# Patient Record
Sex: FEMALE | Employment: OTHER
[De-identification: names, ages, dates, MRNs, and addresses within clinical notes are randomized per-mention and may not be internally consistent; named-entity substitution may affect disease eponyms.]

---

## 2021-01-01 ENCOUNTER — TRANSCRIBE ORDERS (OUTPATIENT)
Dept: OTHER | Age: 81
End: 2021-01-01

## 2021-01-01 ENCOUNTER — TRANSFERRED RECORDS (OUTPATIENT)
Dept: HEALTH INFORMATION MANAGEMENT | Facility: CLINIC | Age: 81
End: 2021-01-01

## 2021-01-01 ENCOUNTER — MEDICAL CORRESPONDENCE (OUTPATIENT)
Dept: HEALTH INFORMATION MANAGEMENT | Facility: CLINIC | Age: 81
End: 2021-01-01

## 2021-01-01 DIAGNOSIS — J84.89 NSIP (NONSPECIFIC INTERSTITIAL PNEUMONIA) (H): Primary | ICD-10-CM

## 2021-11-18 NOTE — PROGRESS NOTES
Patient is scheduled for a PFT new appointment with Dr. Gunter on 2/1/2022. 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 Placed orders for PFT.    Shirin Castro RN

## 2021-11-18 NOTE — Clinical Note
Future Appointments  1/31/2022  1:30 PM    CS PULMONARY FUNCTION      CSPULM                2/1/2022   3:00 PM    Demetria Gunter MD         [facility]

## 2022-01-01 ENCOUNTER — DOCUMENTATION ONLY (OUTPATIENT)
Dept: PULMONOLOGY | Facility: CLINIC | Age: 82
End: 2022-01-01

## 2022-01-01 ENCOUNTER — TRANSFERRED RECORDS (OUTPATIENT)
Dept: HEALTH INFORMATION MANAGEMENT | Facility: CLINIC | Age: 82
End: 2022-01-01

## 2022-01-01 ENCOUNTER — PATIENT OUTREACH (OUTPATIENT)
Dept: PULMONOLOGY | Facility: CLINIC | Age: 82
End: 2022-01-01

## 2022-01-01 ENCOUNTER — TELEPHONE (OUTPATIENT)
Dept: PULMONOLOGY | Facility: CLINIC | Age: 82
End: 2022-01-01

## 2022-01-01 ENCOUNTER — TRANSFERRED RECORDS (OUTPATIENT)
Dept: HEALTH INFORMATION MANAGEMENT | Facility: CLINIC | Age: 82
End: 2022-01-01
Payer: MEDICARE

## 2022-01-01 ENCOUNTER — TELEPHONE (OUTPATIENT)
Dept: PULMONOLOGY | Facility: CLINIC | Age: 82
End: 2022-01-01
Payer: MEDICARE

## 2022-01-01 ENCOUNTER — LAB (OUTPATIENT)
Dept: LAB | Facility: CLINIC | Age: 82
End: 2022-01-01
Payer: MEDICARE

## 2022-01-01 ENCOUNTER — PATIENT OUTREACH (OUTPATIENT)
Dept: PULMONOLOGY | Facility: CLINIC | Age: 82
End: 2022-01-01
Payer: MEDICARE

## 2022-01-01 ENCOUNTER — ANCILLARY PROCEDURE (OUTPATIENT)
Dept: CT IMAGING | Facility: CLINIC | Age: 82
End: 2022-01-01
Attending: INTERNAL MEDICINE
Payer: MEDICARE

## 2022-01-01 ENCOUNTER — TEAM CONFERENCE (OUTPATIENT)
Dept: PULMONOLOGY | Facility: CLINIC | Age: 82
End: 2022-01-01
Payer: MEDICARE

## 2022-01-01 ENCOUNTER — OFFICE VISIT (OUTPATIENT)
Dept: PULMONOLOGY | Facility: CLINIC | Age: 82
End: 2022-01-01
Attending: INTERNAL MEDICINE
Payer: MEDICARE

## 2022-01-01 VITALS
WEIGHT: 111 LBS | BODY MASS INDEX: 20.43 KG/M2 | HEART RATE: 69 BPM | SYSTOLIC BLOOD PRESSURE: 137 MMHG | HEIGHT: 62 IN | DIASTOLIC BLOOD PRESSURE: 87 MMHG | OXYGEN SATURATION: 99 % | RESPIRATION RATE: 17 BRPM

## 2022-01-01 VITALS — SYSTOLIC BLOOD PRESSURE: 136 MMHG | HEART RATE: 71 BPM | DIASTOLIC BLOOD PRESSURE: 72 MMHG | OXYGEN SATURATION: 100 %

## 2022-01-01 DIAGNOSIS — J67.9 HYPERSENSITIVITY PNEUMONITIS (H): ICD-10-CM

## 2022-01-01 DIAGNOSIS — J84.9 ILD (INTERSTITIAL LUNG DISEASE) (H): Primary | ICD-10-CM

## 2022-01-01 DIAGNOSIS — J84.89 NSIP (NONSPECIFIC INTERSTITIAL PNEUMONIA) (H): ICD-10-CM

## 2022-01-01 DIAGNOSIS — J45.909 REACTIVE AIRWAY DISEASE: ICD-10-CM

## 2022-01-01 DIAGNOSIS — R09.02 HYPOXIA: ICD-10-CM

## 2022-01-01 DIAGNOSIS — J84.9 ILD (INTERSTITIAL LUNG DISEASE) (H): ICD-10-CM

## 2022-01-01 DIAGNOSIS — J45.909 REACTIVE AIRWAY DISEASE: Primary | ICD-10-CM

## 2022-01-01 DIAGNOSIS — J30.1 NON-SEASONAL ALLERGIC RHINITIS DUE TO POLLEN: ICD-10-CM

## 2022-01-01 DIAGNOSIS — J67.9 HYPERSENSITIVITY PNEUMONITIS (H): Primary | ICD-10-CM

## 2022-01-01 DIAGNOSIS — J84.89 NSIP (NONSPECIFIC INTERSTITIAL PNEUMONIA) (H): Primary | ICD-10-CM

## 2022-01-01 DIAGNOSIS — J43.9 PULMONARY EMPHYSEMA, UNSPECIFIED EMPHYSEMA TYPE (H): Primary | ICD-10-CM

## 2022-01-01 LAB
6 MIN WALK (FT): 680 FT
6 MIN WALK (M): 207 M
A ALTERNATA IGE QN: <0.1 KU(A)/L
A FLAVUS AB SER QL ID: NORMAL
A FUMIGATUS IGE QN: <0.1 KU(A)/L
A FUMIGATUS1 AB SER QL ID: NORMAL
A FUMIGATUS2 AB SER QL ID: NORMAL
A FUMIGATUS3 AB SER QL ID: NORMAL
A FUMIGATUS6 AB SER QL ID: NORMAL
A PULLULANS AB SER QL ID: NORMAL
ALBUMIN SERPL-MCNC: 3 G/DL (ref 3.4–5)
ALDOLASE SERPL-CCNC: 4.9 U/L
ALP SERPL-CCNC: 44 U/L (ref 40–150)
ALT SERPL W P-5'-P-CCNC: 32 U/L (ref 0–50)
ANION GAP SERPL CALCULATED.3IONS-SCNC: 5 MMOL/L (ref 3–14)
AST SERPL W P-5'-P-CCNC: 25 U/L (ref 0–45)
BASOPHILS # BLD AUTO: 0 10E3/UL (ref 0–0.2)
BASOPHILS NFR BLD AUTO: 0 %
BERMUDA GRASS IGE QN: <0.1 KU(A)/L
BILIRUB DIRECT SERPL-MCNC: 0.1 MG/DL (ref 0–0.2)
BILIRUB SERPL-MCNC: 0.4 MG/DL (ref 0.2–1.3)
BUN SERPL-MCNC: 25 MG/DL (ref 7–30)
C HERBARUM IGE QN: <0.1 KU(A)/L
CALCIUM SERPL-MCNC: 9.3 MG/DL (ref 8.5–10.1)
CAT DANDER IGG QN: <0.1 KU(A)/L
CEDAR IGE QN: <0.1 KU(A)/L
CHLORIDE BLD-SCNC: 99 MMOL/L (ref 94–109)
CO2 SERPL-SCNC: 32 MMOL/L (ref 20–32)
COMMON RAGWEED IGE QN: <0.1 KU(A)/L
COTTONWOOD IGE QN: <0.1 KU(A)/L
CREAT SERPL-MCNC: 0.98 MG/DL (ref 0.52–1.04)
CRP SERPL-MCNC: 16.4 MG/L (ref 0–8)
D FARINAE IGE QN: <0.1 KU(A)/L
D PTERONYSS IGE QN: <0.1 KU(A)/L
DLCOUNC-%PRED-PRE: 37 %
DLCOUNC-PRE: 6.53 ML/MIN/MMHG
DLCOUNC-PRED: 17.31 ML/MIN/MMHG
DOG DANDER+EPITH IGE QN: <0.1 KU(A)/L
EOSINOPHIL # BLD AUTO: 0 10E3/UL (ref 0–0.7)
EOSINOPHIL NFR BLD AUTO: 0 %
ERV-%PRED-PRE: 71 %
ERV-PRE: 0.51 L
ERV-PRED: 0.71 L
ERYTHROCYTE [DISTWIDTH] IN BLOOD BY AUTOMATED COUNT: 14.7 % (ref 10–15)
ERYTHROCYTE [SEDIMENTATION RATE] IN BLOOD BY WESTERGREN METHOD: 35 MM/HR (ref 0–30)
EXPTIME-PRE: 7.04 SEC
FEF2575-%PRED-POST: 96 %
FEF2575-%PRED-PRE: 100 %
FEF2575-POST: 1.28 L/SEC
FEF2575-PRE: 1.34 L/SEC
FEF2575-PRED: 1.33 L/SEC
FEFMAX-%PRED-PRE: 63 %
FEFMAX-PRE: 2.63 L/SEC
FEFMAX-PRED: 4.18 L/SEC
FEV1-%PRED-PRE: 71 %
FEV1-PRE: 1.16 L
FEV1FEV6-PRE: 86 %
FEV1FEV6-PRED: 77 %
FEV1FVC-PRE: 86 %
FEV1FVC-PRED: 78 %
FEV1SVC-PRE: 84 %
FEV1SVC-PRED: 64 %
FIFMAX-PRE: 1.7 L/SEC
FVC-%PRED-PRE: 64 %
FVC-PRE: 1.36 L
FVC-PRED: 2.1 L
GFR SERPL CREATININE-BSD FRML MDRD: 57 ML/MIN/1.73M2
GLUCOSE BLD-MCNC: 129 MG/DL (ref 70–99)
HCT VFR BLD AUTO: 38.9 % (ref 35–47)
HGB BLD-MCNC: 12.3 G/DL (ref 11.7–15.7)
IC-%PRED-PRE: 48 %
IC-PRE: 0.87 L
IC-PRED: 1.81 L
IGE SERPL-ACNC: 5 KU/L (ref 0–114)
IMM GRANULOCYTES # BLD: 0.1 10E3/UL
IMM GRANULOCYTES NFR BLD: 1 %
LYMPHOCYTES # BLD AUTO: 1.2 10E3/UL (ref 0.8–5.3)
LYMPHOCYTES NFR BLD AUTO: 10 %
MAPLE IGE QN: <0.1 KU(A)/L
MARSH ELDER IGE QN: <0.1 KU(A)/L
MCH RBC QN AUTO: 30.4 PG (ref 26.5–33)
MCHC RBC AUTO-ENTMCNC: 31.6 G/DL (ref 31.5–36.5)
MCV RBC AUTO: 96 FL (ref 78–100)
MONOCYTES # BLD AUTO: 0.4 10E3/UL (ref 0–1.3)
MONOCYTES NFR BLD AUTO: 3 %
MOUSE URINE PROT IGE QN: <0.1 KU(A)/L
NETTLE IGE QN: <0.1 KU(A)/L
NEUTROPHILS # BLD AUTO: 10.2 10E3/UL (ref 1.6–8.3)
NEUTROPHILS NFR BLD AUTO: 86 %
NRBC # BLD AUTO: 0 10E3/UL
NRBC BLD AUTO-RTO: 0 /100
P NOTATUM IGE QN: <0.1 KU(A)/L
PIGEON SERUM AB QL ID: NORMAL
PLATELET # BLD AUTO: 213 10E3/UL (ref 150–450)
POTASSIUM BLD-SCNC: 5.2 MMOL/L (ref 3.4–5.3)
PROT SERPL-MCNC: 6.9 G/DL (ref 6.8–8.8)
RBC # BLD AUTO: 4.05 10E6/UL (ref 3.8–5.2)
ROACH IGE QN: 0.14 KU(A)/L
S RECTIVIRGULA AB SER QL ID: NORMAL
S VIRIDIS AB SER QL ID: NORMAL
SALTWORT IGE QN: <0.1 KU(A)/L
SILVER BIRCH IGE QN: <0.1 KU(A)/L
SODIUM SERPL-SCNC: 136 MMOL/L (ref 133–144)
T CANDIDUS AB SER QL: NORMAL
T VULGARIS1 AB SER QL ID: NORMAL
TIMOTHY IGE QN: <0.1 KU(A)/L
TPMT BLD-CCNC: 32.7 U/ML
VA-%PRED-PRE: 56 %
VA-PRE: 2.37 L
VC-%PRED-PRE: 54 %
VC-PRE: 1.38 L
VC-PRED: 2.52 L
WBC # BLD AUTO: 11.8 10E3/UL (ref 4–11)
WHITE ASH IGE QN: <0.1 KU(A)/L
WHITE ELM IGE QN: <0.1 KU(A)/L
WHITE MULBERRY IGE QN: <0.1 KU(A)/L
WHITE OAK IGE QN: <0.1 KU(A)/L

## 2022-01-01 PROCEDURE — G0463 HOSPITAL OUTPT CLINIC VISIT: HCPCS

## 2022-01-01 PROCEDURE — 36415 COLL VENOUS BLD VENIPUNCTURE: CPT | Performed by: PATHOLOGY

## 2022-01-01 PROCEDURE — 85652 RBC SED RATE AUTOMATED: CPT | Performed by: PATHOLOGY

## 2022-01-01 PROCEDURE — 86003 ALLG SPEC IGE CRUDE XTRC EA: CPT | Performed by: INTERNAL MEDICINE

## 2022-01-01 PROCEDURE — 94726 PLETHYSMOGRAPHY LUNG VOLUMES: CPT | Performed by: INTERNAL MEDICINE

## 2022-01-01 PROCEDURE — 86606 ASPERGILLUS ANTIBODY: CPT | Mod: 90 | Performed by: PATHOLOGY

## 2022-01-01 PROCEDURE — 94618 PULMONARY STRESS TESTING: CPT | Performed by: INTERNAL MEDICINE

## 2022-01-01 PROCEDURE — 94729 DIFFUSING CAPACITY: CPT | Performed by: INTERNAL MEDICINE

## 2022-01-01 PROCEDURE — 99214 OFFICE O/P EST MOD 30 MIN: CPT | Performed by: INTERNAL MEDICINE

## 2022-01-01 PROCEDURE — G1004 CDSM NDSC: HCPCS | Mod: GC | Performed by: RADIOLOGY

## 2022-01-01 PROCEDURE — 86140 C-REACTIVE PROTEIN: CPT | Performed by: PATHOLOGY

## 2022-01-01 PROCEDURE — 99000 SPECIMEN HANDLING OFFICE-LAB: CPT | Performed by: PATHOLOGY

## 2022-01-01 PROCEDURE — 82085 ASSAY OF ALDOLASE: CPT | Mod: 90 | Performed by: PATHOLOGY

## 2022-01-01 PROCEDURE — 82248 BILIRUBIN DIRECT: CPT | Performed by: PATHOLOGY

## 2022-01-01 PROCEDURE — 71250 CT THORAX DX C-: CPT | Mod: MG | Performed by: RADIOLOGY

## 2022-01-01 PROCEDURE — 86331 IMMUNODIFFUSION OUCHTERLONY: CPT | Mod: 90 | Performed by: PATHOLOGY

## 2022-01-01 PROCEDURE — 85025 COMPLETE CBC W/AUTO DIFF WBC: CPT | Performed by: PATHOLOGY

## 2022-01-01 PROCEDURE — 82657 ENZYME CELL ACTIVITY: CPT | Mod: 90 | Performed by: PATHOLOGY

## 2022-01-01 PROCEDURE — 94060 EVALUATION OF WHEEZING: CPT | Performed by: INTERNAL MEDICINE

## 2022-01-01 PROCEDURE — 80053 COMPREHEN METABOLIC PANEL: CPT | Performed by: PATHOLOGY

## 2022-01-01 PROCEDURE — 99205 OFFICE O/P NEW HI 60 MIN: CPT | Mod: 25 | Performed by: INTERNAL MEDICINE

## 2022-01-01 PROCEDURE — G0463 HOSPITAL OUTPT CLINIC VISIT: HCPCS | Mod: 25

## 2022-01-01 RX ORDER — DILTIAZEM HYDROCHLORIDE 120 MG/1
CAPSULE, EXTENDED RELEASE ORAL
COMMUNITY
Start: 2022-01-01

## 2022-01-01 RX ORDER — ARFORMOTEROL TARTRATE 15 UG/2ML
15 SOLUTION RESPIRATORY (INHALATION) 2 TIMES DAILY
Qty: 360 ML | Refills: 1 | Status: SHIPPED | OUTPATIENT
Start: 2022-01-01 | End: 2022-01-01

## 2022-01-01 RX ORDER — ROSUVASTATIN CALCIUM 5 MG/1
TABLET, COATED ORAL
COMMUNITY

## 2022-01-01 RX ORDER — CITALOPRAM HYDROBROMIDE 20 MG/1
10 TABLET ORAL
COMMUNITY

## 2022-01-01 RX ORDER — AZATHIOPRINE 75 MG/1
50 TABLET ORAL DAILY
Qty: 14 TABLET | Refills: 0 | Status: SHIPPED | OUTPATIENT
Start: 2022-01-01

## 2022-01-01 RX ORDER — AZATHIOPRINE 50 MG/1
50 TABLET ORAL DAILY
Qty: 14 TABLET | Refills: 0 | Status: SHIPPED | OUTPATIENT
Start: 2022-01-01 | End: 2022-01-01

## 2022-01-01 RX ORDER — FORMOTEROL FUMARATE DIHYDRATE 20 UG/2ML
20 SOLUTION RESPIRATORY (INHALATION) EVERY 12 HOURS
Qty: 90 ML | Refills: 3 | Status: SHIPPED | OUTPATIENT
Start: 2022-01-01

## 2022-01-01 RX ORDER — PREDNISONE 20 MG/1
20 TABLET ORAL DAILY
Qty: 30 TABLET | Refills: 3 | Status: CANCELLED | OUTPATIENT
Start: 2022-01-01

## 2022-01-01 RX ORDER — DILTIAZEM HCL 60 MG
TABLET ORAL
COMMUNITY
Start: 2021-01-01

## 2022-01-01 RX ORDER — SULFAMETHOXAZOLE AND TRIMETHOPRIM 400; 80 MG/1; MG/1
TABLET ORAL
Qty: 30 TABLET | Refills: 11 | Status: SHIPPED | OUTPATIENT
Start: 2022-01-01

## 2022-01-01 RX ORDER — SULFAMETHOXAZOLE/TRIMETHOPRIM 800-160 MG
1 TABLET ORAL
Qty: 36 TABLET | Refills: 0 | Status: SHIPPED | OUTPATIENT
Start: 2022-01-01 | End: 2022-01-01

## 2022-01-01 RX ORDER — AZELASTINE 1 MG/ML
SPRAY, METERED NASAL
COMMUNITY
Start: 2022-01-01

## 2022-01-01 RX ORDER — DIGOXIN 125 MCG
125 TABLET ORAL
COMMUNITY
Start: 2022-01-01

## 2022-01-01 RX ORDER — PREDNISONE 20 MG/1
20 TABLET ORAL DAILY
Qty: 30 TABLET | Refills: 3 | Status: SHIPPED | OUTPATIENT
Start: 2022-01-01 | End: 2022-01-01

## 2022-01-01 RX ORDER — CLOPIDOGREL BISULFATE 75 MG/1
TABLET ORAL
COMMUNITY

## 2022-01-01 RX ORDER — PREDNISONE 20 MG/1
20 TABLET ORAL DAILY
Qty: 30 TABLET | Refills: 1 | Status: SHIPPED | OUTPATIENT
Start: 2022-01-01

## 2022-01-01 RX ORDER — DILTIAZEM HYDROCHLORIDE 180 MG/1
CAPSULE, EXTENDED RELEASE ORAL
COMMUNITY

## 2022-01-01 RX ORDER — CARVEDILOL 3.12 MG/1
TABLET ORAL
COMMUNITY
Start: 2022-01-01

## 2022-01-01 RX ORDER — AMOXICILLIN 500 MG/1
CAPSULE ORAL
COMMUNITY

## 2022-01-01 RX ORDER — MYCOPHENOLATE MOFETIL 500 MG/1
500 TABLET ORAL 2 TIMES DAILY
Qty: 60 TABLET | Refills: 2 | Status: SHIPPED | OUTPATIENT
Start: 2022-01-01

## 2022-01-01 RX ORDER — ARFORMOTEROL TARTRATE 15 UG/2ML
15 SOLUTION RESPIRATORY (INHALATION) 2 TIMES DAILY
Qty: 360 ML | Refills: 1 | Status: SHIPPED | OUTPATIENT
Start: 2022-01-01

## 2022-01-01 RX ORDER — BUDESONIDE 0.5 MG/2ML
0.5 INHALANT ORAL DAILY
Qty: 120 ML | Refills: 1 | Status: SHIPPED | OUTPATIENT
Start: 2022-01-01 | End: 2022-10-17

## 2022-01-01 RX ORDER — ATENOLOL 25 MG/1
TABLET ORAL
COMMUNITY

## 2022-01-01 RX ORDER — METOPROLOL SUCCINATE 50 MG/1
TABLET, EXTENDED RELEASE ORAL
COMMUNITY

## 2022-01-01 RX ORDER — FUROSEMIDE 20 MG
TABLET ORAL
COMMUNITY

## 2022-01-01 RX ORDER — ARFORMOTEROL TARTRATE 15 UG/2ML
15 SOLUTION RESPIRATORY (INHALATION) 2 TIMES DAILY
Qty: 360 ML | Refills: 1 | Status: CANCELLED | OUTPATIENT
Start: 2022-01-01

## 2022-01-01 RX ORDER — PREDNISONE 20 MG/1
40 TABLET ORAL DAILY
Qty: 120 TABLET | Refills: 0 | Status: SHIPPED | OUTPATIENT
Start: 2022-01-01 | End: 2022-01-01

## 2022-01-01 RX ORDER — WARFARIN SODIUM 3 MG/1
TABLET ORAL
COMMUNITY
Start: 2022-01-01

## 2022-01-01 RX ORDER — ONDANSETRON 4 MG/1
TABLET, ORALLY DISINTEGRATING ORAL
COMMUNITY

## 2022-01-01 RX ORDER — AMLODIPINE BESYLATE 5 MG/1
TABLET ORAL
COMMUNITY

## 2022-01-01 ASSESSMENT — PAIN SCALES - GENERAL: PAINLEVEL: NO PAIN (0)

## 2022-01-25 NOTE — PROGRESS NOTES
Patient will be seeing Dr. Miguel on 5/16/22 for new ILD Consult.  Testing orders placed for visit.     Latonya Ocampo RN, BSN  ILD Nurse Care Coordinator  (P) [phone]

## 2022-05-23 NOTE — NURSING NOTE
Chief Complaint   Patient presents with     Consult     New ILD     Medications reviewed and vital signs taken.   Chel Ray CMA

## 2022-05-23 NOTE — PROGRESS NOTES
"[facility]  Pulmonary Medicine  Visit Clinic Note  May 23, 2022    Dear patient. Thank you for visiting with me. I want you to feel respected, understood, and empowered. \"Respect\" is valuing you as much as I would a close family member. \"Empowerment\" happens when you are fully informed, and can make the best possible decision for you.  Please ask me questions!  Challenge anything that is not clear.       ASSESSMENT & PLAN     Patient is an 81-year-old female who presents to pulmonary clinic for further work-up of her interstitial lung disease.    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  - It appears that she had interstitial lung disease noted few months before her she got COVID.  It is hard to determine that her significant worsening was from COVID or ILD worsening.  Her pulmonary function test in May 2022 is worse compared to her PFTs in September 2021.  - 6-minute walk test is requiring 2 L of oxygen with activity which was not required in the past.  - Some of her dyspnea can be explained by her recent stroke as well as ear disease.  But overall she is not improving.  - I will finish her work-up and add HP panel and allergen panel.  - I will order high-resolution CT scan of the chest to further assess her lung disease.  I have low threshold to start her on immunosuppression.  But with the reason being that she is also on warfarin and starting prophylactic antibiotic may increase the risk of bleeding as well as increased risk of glucose be holding off as of now and discuss her case in the ILD conference.  ANCA: Negative.  C reactive pro: Increased in August.  RF, CCP negative, BERTRAND : 1:160 positive.     -I have added LABA/ICS inhaler.   RTC in 3 months.     63 minutes excluding the time spent on cigarette cessation was  " spent on the date of the encounter doing chart review, history and exam, documentation and further activities as noted above.    These conclusions are made at the best of one's knowledge and belief based on the provided evidence such as patient's history and allergy test results and they can change over time or can be incomplete because of missing information's.    I explained the lab values, imagings and findings to the patient.  Patient expressed understanding I did not recognize any barriers to the understanding of the patient.    The above note was dictated using voice recognition software and may include typographical errors. Please contact the author for any clarifications.    Huang Miguel MD   RN Coordinators: Greg/Laly/Mariana: [phone]  ILD RN Coordinators: [phone]  Clinic Number: [phone]  Pager: [phone]       Today's visit note:     Chief Complaint: Porsha CHANDLER Buswendyy is a 81 year old year old female who is being seen for Consult (New ILD )      HPI:   - Patient is an 81 year old female who is referred to Pulmonary clinic for further work up of her ILD.  She was referred to pulmonary clinic. But she had a covid infection in September.     - She had an A fib with RVR in  August 2021. She had a Ct chest at the time and She was noted to have an ILD.      Stroke: January 2022. She is not able to drive anymore.    -ear infection.  She has been treated of Steroids for 21 days which did not improve her ear congestion.  She was prescribned 5 mg of prednisone.   -She has had TAVr in 2021 and which ahd improved her dyspnea.  After her TAVR when she was taken off of amio she had A fib recurrence. And since than she feels that she has had worsening dyspnea.   - Has not tried any inhaler . She feels that her breathing has not improved since her Dyspnea in August.  Her coruse also has been complicated by stroke and Covid as well.  Also ears also has made it hard for her,     -With activity she is mainly  short of breath.  Her family feels that anxiety can be the reason but she is not an anxious person at baseline and I explained that dyspnea can be the reason for anxiety.     ILD exposure questions:  Occupation: School .  Made bread. High school. Wood burning stove.  Used until 7 years ago.   -Lives in farm house.  It is 100 years old. It has been remodeled.   - Gradually since 1968.   Mold: In Basement   Pet bird: Chicken and cows exposure.  Chicken raising. Also lot of straw. Also has a big garden.  Usual vegetables.   Used to have bats in atticks.   Hot tub: No   Portable humidifier:  Has them. It has a filter which she changes.  Its portable.   Brass or woodwind instruments:  Smoking tobacco or marijuana: No smoking history.   secondary smoke history.   Feather pillow/blanket: Used to have all feather comforter. Hasn't had one is many years.   Chemotherapy or radiation therapy: No   Fam hx of ILD: None  Sister has  Rheumatoid Arthritis.          Medications:     No current outpatient medications on file.     No current facility-administered medications for this visit.            Review of Systems:       A complete 10 point review of systems was otherwise negative except as noted in the HPI.        PHYSICAL EXAM:  There were no vitals taken for this visit.     General: Well developed, well nourished, No apparent distress  Eyes: Anicteric  Nose: Nasal mucosa with no edema or hyperemia.  No polyps  Ears: Hearing grossly normal  Mouth: Oral mucosa is moist, without any lesions. No oropharyngeal exudate.  Respiratory: Good air movement. No crackles. No rhonchi. No wheezes  Cardiac: RRR, normal S1, S2. No murmurs. No JVD  Abdomen: Soft, NT/ND  Musculoskeletal: Extremities normal. No clubbing. No cyanosis. No edema.  Skin: No rash on limited exam  Neuro: Normal mentation. Normal speech.  Psych:Normal affect           Data:   All laboratory and imaging data reviewed.      PFT:    5/23/22 9/2021    PFT Interpretation:  I personally reviewed and interpreted the PFTs.    Her peftin 2022 has worsened comapred to 2021.   Chest CT: I personally reviewed and interpreted the CT scan.    -Ct chest in 11/2021 has improved when comapred to 8 /2021 8/26    \      Recent Results (from the past 168 hour(s))   General PFT Lab (Please always keep checked)    Collection Time: 05/23/22 12:25 PM   Result Value Ref Range    FVC-Pred 2.10 L    FVC-Pre 1.36 L    FVC-%Pred-Pre 64 %    FEV1-Pre 1.16 L    FEV1-%Pred-Pre 71 %    FEV1FVC-Pred 78 %    FEV1FVC-Pre 86 %    FEFMax-Pred 4.18 L/sec    FEFMax-Pre 2.63 L/sec    FEFMax-%Pred-Pre 63 %    FEF2575-Pred 1.33 L/sec    FEF2575-Pre 1.34 L/sec    HRH6035-%Pred-Pre 100 %    FEF2575-Post 1.28 L/sec    AFE6503-%Pred-Post 96 %    ExpTime-Pre 7.04 sec    FIFMax-Pre 1.70 L/sec    VC-Pred 2.52 L    VC-Pre 1.38 L    VC-%Pred-Pre 54 %    IC-Pred 1.81 L    IC-Pre 0.87 L    IC-%Pred-Pre 48 %    ERV-Pred 0.71 L    ERV-Pre 0.51 L    ERV-%Pred-Pre 71 %    FEV1FEV6-Pred 77 %    FEV1FEV6-Pre 86 %    DLCOunc-Pred 17.31 ml/min/mmHg    DLCOunc-Pre 6.53 ml/min/mmHg    DLCOunc-%Pred-Pre 37 %    VA-Pre 2.37 L    VA-%Pred-Pre 56 %    FEV1SVC-Pred 64 %    FEV1SVC-Pre 84 %

## 2022-05-23 NOTE — LETTER
"    5/23/2022         RE: Porsha Bullock  [address]  [place] [address]        Dear Colleague,    Thank you for referring your patient, Porsha Bullock, to the [facility] [place]. Please see a copy of my visit note below.    [facility]  Pulmonary Medicine  Visit Clinic Note  May 23, 2022    Dear patient. Thank you for visiting with me. I want you to feel respected, understood, and empowered. \"Respect\" is valuing you as much as I would a close family member. \"Empowerment\" happens when you are fully informed, and can make the best possible decision for you.  Please ask me questions!  Challenge anything that is not clear.       ASSESSMENT & PLAN     Patient is an 81-year-old female who presents to pulmonary clinic for further work-up of her interstitial lung disease.    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  - It appears that she had interstitial lung disease noted few months before her she got COVID.  It is hard to determine that her significant worsening was from COVID or ILD worsening.  Her pulmonary function test in May 2022 is worse compared to her PFTs in September 2021.  - 6-minute walk test is requiring 2 L of oxygen with activity which was not required in the past.  - Some of her dyspnea can be explained by her recent stroke as well as ear disease.  But overall she is not improving.  - I will finish her work-up and add HP panel and allergen panel.  - I will order high-resolution CT scan of the chest to further assess her lung disease.  I have low threshold to start her on immunosuppression.  But with the reason being that she is also on warfarin and starting prophylactic antibiotic may increase the risk of bleeding as well as increased risk of glucose be " holding off as of now and discuss her case in the ILD conference.  ANCA: Negative.  C reactive pro: Increased in August.  RF, CCP negative, BERTRAND : 1:160 positive.     -I have added LABA/ICS inhaler.   RTC in 3 months.     63 minutes excluding the time spent on cigarette cessation was  spent on the date of the encounter doing chart review, history and exam, documentation and further activities as noted above.    These conclusions are made at the best of one's knowledge and belief based on the provided evidence such as patient's history and allergy test results and they can change over time or can be incomplete because of missing information's.    I explained the lab values, imagings and findings to the patient.  Patient expressed understanding I did not recognize any barriers to the understanding of the patient.    The above note was dictated using voice recognition software and may include typographical errors. Please contact the author for any clarifications.    Huang Miguel MD   RN Coordinators: Greg/Laly/Mariana: [phone]  ILD RN Coordinators: [phone]  Clinic Number: [phone]  Pager: [phone]       Today's visit note:     Chief Complaint: Porsha Bullock is a 81 year old year old female who is being seen for Consult (New ILD )      HPI:   - Patient is an 81 year old female who is referred to Pulmonary clinic for further work up of her ILD.  She was referred to pulmonary clinic. But she had a covid infection in September.     - She had an A fib with RVR in  August 2021. She had a Ct chest at the time and She was noted to have an ILD.      Stroke: January 2022. She is not able to drive anymore.    -ear infection.  She has been treated of Steroids for 21 days which did not improve her ear congestion.  She was prescribned 5 mg of prednisone.   -She has had TAVr in 2021 and which ahd improved her dyspnea.  After her TAVR when she was taken off of amio she had A fib recurrence. And since than she feels  that she has had worsening dyspnea.   - Has not tried any inhaler . She feels that her breathing has not improved since her Dyspnea in August.  Her coruse also has been complicated by stroke and Covid as well.  Also ears also has made it hard for her,     -With activity she is mainly short of breath.  Her family feels that anxiety can be the reason but she is not an anxious person at baseline and I explained that dyspnea can be the reason for anxiety.     ILD exposure questions:  Occupation: School .  Made bread. High school. Wood burning stove.  Used until 7 years ago.   -Lives in farm house.  It is 100 years old. It has been remodeled.   - Gradually since 1968.   Mold: In Basement   Pet bird: Chicken and cows exposure.  Chicken raising. Also lot of straw. Also has a big garden.  Usual vegetables.   Used to have bats in atticks.   Hot tub: No   Portable humidifier:  Has them. It has a filter which she changes.  Its portable.   Brass or woodwind instruments:  Smoking tobacco or marijuana: No smoking history.   secondary smoke history.   Feather pillow/blanket: Used to have all feather comforter. Hasn't had one is many years.   Chemotherapy or radiation therapy: No   Fam hx of ILD: None  Sister has  Rheumatoid Arthritis.          Medications:     No current outpatient medications on file.     No current facility-administered medications for this visit.            Review of Systems:       A complete 10 point review of systems was otherwise negative except as noted in the HPI.        PHYSICAL EXAM:  There were no vitals taken for this visit.     General: Well developed, well nourished, No apparent distress  Eyes: Anicteric  Nose: Nasal mucosa with no edema or hyperemia.  No polyps  Ears: Hearing grossly normal  Mouth: Oral mucosa is moist, without any lesions. No oropharyngeal exudate.  Respiratory: Good air movement. No crackles. No rhonchi. No wheezes  Cardiac: RRR, normal S1, S2. No murmurs. No  JVD  Abdomen: Soft, NT/ND  Musculoskeletal: Extremities normal. No clubbing. No cyanosis. No edema.  Skin: No rash on limited exam  Neuro: Normal mentation. Normal speech.  Psych:Normal affect           Data:   All laboratory and imaging data reviewed.      PFT:   5/23/22 9/2021    PFT Interpretation:  I personally reviewed and interpreted the PFTs.    Her peftin 2022 has worsened comapred to 2021.   Chest CT: I personally reviewed and interpreted the CT scan.    -Ct chest in 11/2021 has improved when comapred to 8 /2021 8/26    \      Recent Results (from the past 168 hour(s))   General PFT Lab (Please always keep checked)    Collection Time: 05/23/22 12:25 PM   Result Value Ref Range    FVC-Pred 2.10 L    FVC-Pre 1.36 L    FVC-%Pred-Pre 64 %    FEV1-Pre 1.16 L    FEV1-%Pred-Pre 71 %    FEV1FVC-Pred 78 %    FEV1FVC-Pre 86 %    FEFMax-Pred 4.18 L/sec    FEFMax-Pre 2.63 L/sec    FEFMax-%Pred-Pre 63 %    FEF2575-Pred 1.33 L/sec    FEF2575-Pre 1.34 L/sec    OOK1037-%Pred-Pre 100 %    FEF2575-Post 1.28 L/sec    OGY7155-%Pred-Post 96 %    ExpTime-Pre 7.04 sec    FIFMax-Pre 1.70 L/sec    VC-Pred 2.52 L    VC-Pre 1.38 L    VC-%Pred-Pre 54 %    IC-Pred 1.81 L    IC-Pre 0.87 L    IC-%Pred-Pre 48 %    ERV-Pred 0.71 L    ERV-Pre 0.51 L    ERV-%Pred-Pre 71 %    FEV1FEV6-Pred 77 %    FEV1FEV6-Pre 86 %    DLCOunc-Pred 17.31 ml/min/mmHg    DLCOunc-Pre 6.53 ml/min/mmHg    DLCOunc-%Pred-Pre 37 %    VA-Pre 2.37 L    VA-%Pred-Pre 56 %    FEV1SVC-Pred 64 %    FEV1SVC-Pre 84 %       Again, thank you for allowing me to participate in the care of your patient.        Sincerely,        Huang Miguel MD

## 2022-05-24 NOTE — PROGRESS NOTES
Spoke with patient and daughter regarding starting oxygen. Answered questions. Orders placed and faxed to [facility] ([facility] (ph. [phone]).

## 2022-05-26 NOTE — TELEPHONE ENCOUNTER
----- Message from Huang Miguel MD sent at 5/25/2022 10:20 PM CDT -----  Allen Oscar,     Can you please call patient and tell her that I do want to start her on Prednisone. As of now we will start her on 20 mg daily prednisone. I am not starting Bactrim, yet as we may be able to drop it to 15 if everyone agrees.     -I wanna avoid prophylactic abxx as it will really mess up her warfarin and that can be prety risky.     -If she asks why prednisone than it is because her inflammatory markers are higher which suggests ongoing inflammation.     Thanks,  Huang

## 2022-05-26 NOTE — RESULT ENCOUNTER NOTE
Allen Oscar,     Can you please call patient and tell her that I do want to start her on Prednisone. As of now we will start her on 20 mg daily prednisone. I am not starting Bactrim, yet as we may be able to drop it to 15 if everyone agrees.     -I wanna avoid prophylactic abxx as it will really mess up her warfarin and that can be prety risky.     -If she asks why prednisone than it is because her inflammatory markers are higher which suggests ongoing inflammation.     Thanks,  Hem

## 2022-06-13 NOTE — PROGRESS NOTES
Contacted patient regarding Dr. Miguel ordering Cellcept and bactrim.  Discussed plan with patient including side effects side of Cellcept. Informed patient that Bactrim can affect INR and that she will need to notify her anticoagulation clinic that she is starting on Bactrim with recommendation to check INR at least weekly until stable.  Patient verbalizes understanding.  She requests her daughter, Valery, to be called to discuss the treatment plan.  Contacted Valery and discussed the plan, side effects, and need for monitoring INR and other labs for Cellcept.

## 2022-06-13 NOTE — TELEPHONE ENCOUNTER
ILD Conference      Patient Name: Porsha CHANDLER Busbey    Reason for conference discussion/Specific Question:  Review CT scan, diagnosis and treatment plan.    Referring Physician:      Radiology Interpretation: Patient has ICD in place, lots of atherosclerosis.  Pleural thickening, small nodule, coarse fibrosis, gas trapping, and striking nodularity. HP is the most likely fit.     Pathology Interpretation: N/A      There was a consensus recommendation for the following actions:   Start patient on low dose Cellcept 500 mg BID until appointment with Dr. Miguel in August.  Also start patient on Bactrim for pjp. Patient on Warfarin so will need to alert INR clinic that she will be taking Bactrim.     Pulmonary/ILD Provider: Dr. Huang Miguel.

## 2022-06-13 NOTE — TELEPHONE ENCOUNTER
[facility] Call Center    Phone Message    May a detailed message be left on voicemail: yes     Reason for Call: Medication Question or concern regarding medication   Prescription Clarification  Name of Medication: sulfamethoxazole-trimethoprim (BACTRIM) 400-80 MG tablet  Prescribing Provider:    Pharmacy: [id number] [place] [address]   What on the order needs clarification?       Per Pharmacy the medication that was prescribe for pt would interfere with some other current medication pt is taking. Please call Pharmacy back to discuss. Thank you!          Action Taken: Message routed to:  [facility] PULM    Travel Screening: Not Applicable

## 2022-06-14 NOTE — TELEPHONE ENCOUNTER
RN spoke with pharmacist and reviewed that pt will follow with INR clinic weekly to ensure ok. Pharmacist will dispense Rx.   Celestina Armas RN BSN

## 2022-06-27 NOTE — PROGRESS NOTES
Daughter Valery called asking about results of DAVID. Reviewed results of DAVID did show she was below 88% for over 38 minutes, recommending patient should wear oxygen at night 2 LPM. New orders faxed to oxygen company.

## 2022-07-19 NOTE — PROGRESS NOTES
Patient's daughter contacted regarding patient had an EDG yesterday and she was found to have a stomach ulcer. She is wondering if the Cellcept medication need to be switch to something else. Message sent to Dr Miguel.    Dr Miguel, called the daughter explained that less likely due to cellcept. Most likely due to prednisone.     -She is actually open to restart cellcept.   Plan to stay on cellcept.

## 2022-08-03 NOTE — TELEPHONE ENCOUNTER
Pt's daughter called stating pt is not going to talk Cellcept. Pt took one dose on Monday evening at 5pm at 7pm pt complained of tightness in chest and sob. They did a neb treatment which helped but pt still struggled through the next morning. Pt would be interested in some other medication but not Cellcept. Daughter states pt is doing well with oxygen. Reviewed recent DAVID on 2 liters that was enough O2 for pt at night. RN advised would update Dr. Miguel.   Celestina Armas RN

## 2022-08-18 NOTE — NURSING NOTE
Chief Complaint   Patient presents with     Interstitial Lung Disease (ILD)     3 month follow up      Vitals were taken and medications were reconciled.    Eileen Hernandez RMA  1:45 PM

## 2022-08-18 NOTE — PROGRESS NOTES
"[facility]  Pulmonary Medicine  Visit Clinic Note      Dear patient. Thank you for visiting with me. I want you to feel respected, understood, and empowered. \"Respect\" is valuing you as much as I would a close family member. \"Empowerment\" happens when you are fully informed, and can make the best possible decision for you.  Please ask me questions!  Challenge anything that is not clear.       ASSESSMENT & PLAN     Patient is an 81-year-old female who presents to pulmonary clinic for further work-up of her interstitial lung disease.    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  - It appears that she had interstitial lung disease noted few months before her she got COVID.  It is hard to determine that her significant worsening was from COVID or ILD worsening.  Her pulmonary function test in May 2022 is worse compared to her PFTs in September 2021.  - 6-minute walk test is requiring 2 L of oxygen with activity which was not required in the past.  - Some of her dyspnea can be explained by her recent stroke as well as ear disease.  But overall she is not improving.   -Hr HP pane lahs also came back negative.    -She was started on cellcept in March 2022 but she was not able to tolerate it. She had gi ulcer.   -She will be started on imuran.    -I will follow her with Ct chest and PFts in next 3 months. Contineu with current antibacterials.     ANCA: Negative.  C reactive pro: Increased in August.  RF, CCP negative, BERTRAND : 1:160 positive.     -I have added LABA/ICS  Nebulizers.   RTC in 3 months.     34 minutes excluding the time spent on cigarette cessation was  spent on the date of the encounter doing chart review, history and exam, documentation and further activities as noted above.    These conclusions are made at the best of " one's knowledge and belief based on the provided evidence such as patient's history and allergy test results and they can change over time or can be incomplete because of missing information's.    I explained the lab values, imagings and findings to the patient.  Patient expressed understanding I did not recognize any barriers to the understanding of the patient.    The above note was dictated using voice recognition software and may include typographical errors. Please contact the author for any clarifications.    Huang Miguel MD   RN Coordinators: Greg/Laly/Mariana: [phone]  ILD RN Coordinators: [phone]  Clinic Number: [phone]  Pager: [phone]       Today's visit note:     Chief Complaint: Porsha Bullock is a 81 year old year old female who is being seen for Interstitial Lung Disease (ILD) (3 month follow up )      HPI:   - Patient is an 81 year old female who is referred to Pulmonary clinic for further work up of her ILD.  She was referred to pulmonary clinic. But she had a covid infection in September.     - She had an A fib with RVR in  August 2021. She had a Ct chest at the time and She was noted to have an ILD.      Stroke: January 2022. She is not able to drive anymore.    -ear infection.  She has been treated of Steroids for 21 days which did not improve her ear congestion.  She was prescribned 5 mg of prednisone.   -She has had TAVr in 2021 and which ahd improved her dyspnea.  After her TAVR when she was taken off of amio she had A fib recurrence. And since than she feels that she has had worsening dyspnea.   - Has not tried any inhaler . She feels that her breathing has not improved since her Dyspnea in August.  Her coruse also has been complicated by stroke and Covid as well.  Also ears also has made it hard for her,     -With activity she is mainly short of breath.  Her family feels that anxiety can be the reason but she is not an anxious person at baseline and I explained that  dyspnea can be the reason for anxiety.     ILD exposure questions:  Occupation: School .  Made bread. High school. Wood burning stove.  Used until 7 years ago.   -Lives in farm house.  It is 100 years old. It has been remodeled.   - Gradually since 1968.   Mold: In Basement   Pet bird: Chicken and cows exposure.  Chicken raising. Also lot of straw. Also has a big garden.  Usual vegetables.   Used to have bats in atticks.   Hot tub: No   Portable humidifier:  Has them. It has a filter which she changes.  Its portable.   Brass or woodwind instruments:  Smoking tobacco or marijuana: No smoking history.   secondary smoke history.   Feather pillow/blanket: Used to have all feather comforter. Hasn't had one is many years.   Chemotherapy or radiation therapy: No   Fam hx of ILD: None  Sister has  Rheumatoid Arthritis.     #Interval History: 8/17/22  -She had a significant side effects to cell cept. We will try Imuran for her.          Medications:     Current Outpatient Medications   Medication     amLODIPine (NORVASC) 5 MG tablet     B Complex Vitamins (VITAMIN B COMPLEX PO)     carvedilol (COREG) 3.125 MG tablet     citalopram (CELEXA) 20 MG tablet     digoxin (LANOXIN) 125 MCG tablet     diltiazem ER (TIAZAC) 120 MG 24 hr ER beaded capsule     Multiple Vitamins-Minerals (VITAMIN D3 COMPLETE PO)     predniSONE (DELTASONE) 20 MG tablet     rosuvastatin (CRESTOR) 5 MG tablet     warfarin ANTICOAGULANT (COUMADIN) 3 MG tablet     amoxicillin (AMOXIL) 500 MG capsule     atenolol (TENORMIN) 25 MG tablet     azelastine (ASTELIN) 0.1 % nasal spray     Calcium Carbonate (CALCIUM 500 PO)     clopidogrel (PLAVIX) 75 MG tablet     diltiazem (CARDIZEM) 60 MG tablet     diltiazem ER (TIAZAC) 180 MG 24 hr ER beaded capsule     fluticasone-vilanterol (BREO ELLIPTA) 200-25 MCG/INH inhaler     furosemide (LASIX) 20 MG tablet     MAGNESIUM PO     metoprolol succinate ER (TOPROL XL) 50 MG 24 hr tablet     mycophenolate  (GENERIC EQUIVALENT) 500 MG tablet     ondansetron (ZOFRAN ODT) 4 MG ODT tab     sulfamethoxazole-trimethoprim (BACTRIM) 400-80 MG tablet     No current facility-administered medications for this visit.            Review of Systems:       A complete 10 point review of systems was otherwise negative except as noted in the HPI.        PHYSICAL EXAM:  /72   Pulse 71   SpO2 100%      General: Well developed, well nourished, No apparent distress  Eyes: Anicteric  Nose: Nasal mucosa with no edema or hyperemia.  No polyps  Ears: Hearing grossly normal  Mouth: Oral mucosa is moist, without any lesions. No oropharyngeal exudate.  Respiratory: Good air movement. No crackles. No rhonchi. No wheezes  Cardiac: RRR, normal S1, S2. No murmurs. No JVD  Abdomen: Soft, NT/ND  Musculoskeletal: Extremities normal. No clubbing. No cyanosis. No edema.  Skin: No rash on limited exam  Neuro: Normal mentation. Normal speech.  Psych:Normal affect           Data:   All laboratory and imaging data reviewed.     ECHO:  7/2022   1. Normal LV size, borderline wall thickness, normal global systolic function with an estimated EF of 50 - 55%.    2. Abnormal septal motion consistent with post-operative status and abnormal septal motion consistent with RV pacemaker.    3. Right ventricular cavity size is normal, global systolic RV function is normal with a normal estimate of the RVSP.    4. Severely enlarged left and right atrium.    5. The aortic valve is Cisco 3 Ultra bioprosthesis AVR, mild stenosis and no regurgitation. The mean gradient is 16 mmHg.    6. The mitral valve is sclerotic, mild to moderate mitral regurgitation.    7. Severe MAC present.          PFT:   5/23/22 9/2021    PFT Interpretation:  I personally reviewed and interpreted the PFTs.    Her peftin 2022 has worsened comapred to 2021.   Chest CT: I personally reviewed and interpreted the CT scan.    -Ct chest in 11/2021 has improved when comapred to 8  /2021 8/26    \      No results found for this or any previous visit (from the past 168 hour(s)).

## 2022-08-18 NOTE — NURSING NOTE
Met with pt and daughter and reviewed Imuran start up and side effects. Pt will have labs drawn today and then labs in 2 weeks. Labs faxed to [facility] in [place] Phone ; [phone]. Fax: [phone].  Daughter requested that we call her with lab results as she is helping pt with medications.   Celestina Armas RN BSN

## 2022-08-18 NOTE — LETTER
"    8/18/2022         RE: Porsha Bullock  [address]  [place] [address]        Dear Colleague,    Thank you for referring your patient, Porsha Bullock, to the [facility] [place]. Please see a copy of my visit note below.    [facility]  Pulmonary Medicine  Visit Clinic Note      Dear patient. Thank you for visiting with me. I want you to feel respected, understood, and empowered. \"Respect\" is valuing you as much as I would a close family member. \"Empowerment\" happens when you are fully informed, and can make the best possible decision for you.  Please ask me questions!  Challenge anything that is not clear.       ASSESSMENT & PLAN     Patient is an 81-year-old female who presents to pulmonary clinic for further work-up of her interstitial lung disease.    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  - It appears that she had interstitial lung disease noted few months before her she got COVID.  It is hard to determine that her significant worsening was from COVID or ILD worsening.  Her pulmonary function test in May 2022 is worse compared to her PFTs in September 2021.  - 6-minute walk test is requiring 2 L of oxygen with activity which was not required in the past.  - Some of her dyspnea can be explained by her recent stroke as well as ear disease.  But overall she is not improving.   -Hr HP pane lahs also came back negative.    -She was started on cellcept in March 2022 but she was not able to tolerate it. She had gi ulcer.   -She will be started on imuran.    -I will follow her with Ct chest and PFts in next 3 months. Contineu with current antibacterials.     ANCA: Negative.  C reactive pro: Increased in August.  RF, CCP negative, BERTRAND : 1:160 positive.     -I have " added LABA/ICS  Nebulizers.   RTC in 3 months.     34 minutes excluding the time spent on cigarette cessation was  spent on the date of the encounter doing chart review, history and exam, documentation and further activities as noted above.    These conclusions are made at the best of one's knowledge and belief based on the provided evidence such as patient's history and allergy test results and they can change over time or can be incomplete because of missing information's.    I explained the lab values, imagings and findings to the patient.  Patient expressed understanding I did not recognize any barriers to the understanding of the patient.    The above note was dictated using voice recognition software and may include typographical errors. Please contact the author for any clarifications.    Huang Miguel MD   RN Coordinators: Greg/Laly/Mariana: [phone]  ILD RN Coordinators: [phone]  Clinic Number: [phone]  Pager: [phone]       Today's visit note:     Chief Complaint: Porsha uBllock is a 81 year old year old female who is being seen for Interstitial Lung Disease (ILD) (3 month follow up )      HPI:   - Patient is an 81 year old female who is referred to Pulmonary clinic for further work up of her ILD.  She was referred to pulmonary clinic. But she had a covid infection in September.     - She had an A fib with RVR in  August 2021. She had a Ct chest at the time and She was noted to have an ILD.      Stroke: January 2022. She is not able to drive anymore.    -ear infection.  She has been treated of Steroids for 21 days which did not improve her ear congestion.  She was prescribned 5 mg of prednisone.   -She has had TAVr in 2021 and which ahd improved her dyspnea.  After her TAVR when she was taken off of amio she had A fib recurrence. And since than she feels that she has had worsening dyspnea.   - Has not tried any inhaler . She feels that her breathing has not improved since her Dyspnea in  August.  Her coruse also has been complicated by stroke and Covid as well.  Also ears also has made it hard for her,     -With activity she is mainly short of breath.  Her family feels that anxiety can be the reason but she is not an anxious person at baseline and I explained that dyspnea can be the reason for anxiety.     ILD exposure questions:  Occupation: School .  Made bread. High school. Wood burning stove.  Used until 7 years ago.   -Lives in farm house.  It is 100 years old. It has been remodeled.   - Gradually since 1968.   Mold: In Basement   Pet bird: Chicken and cows exposure.  Chicken raising. Also lot of straw. Also has a big garden.  Usual vegetables.   Used to have bats in atticks.   Hot tub: No   Portable humidifier:  Has them. It has a filter which she changes.  Its portable.   Brass or woodwind instruments:  Smoking tobacco or marijuana: No smoking history.   secondary smoke history.   Feather pillow/blanket: Used to have all feather comforter. Hasn't had one is many years.   Chemotherapy or radiation therapy: No   Fam hx of ILD: None  Sister has  Rheumatoid Arthritis.     #Interval History: 8/17/22  -She had a significant side effects to cell cept. We will try Imuran for her.          Medications:     Current Outpatient Medications   Medication     amLODIPine (NORVASC) 5 MG tablet     B Complex Vitamins (VITAMIN B COMPLEX PO)     carvedilol (COREG) 3.125 MG tablet     citalopram (CELEXA) 20 MG tablet     digoxin (LANOXIN) 125 MCG tablet     diltiazem ER (TIAZAC) 120 MG 24 hr ER beaded capsule     Multiple Vitamins-Minerals (VITAMIN D3 COMPLETE PO)     predniSONE (DELTASONE) 20 MG tablet     rosuvastatin (CRESTOR) 5 MG tablet     warfarin ANTICOAGULANT (COUMADIN) 3 MG tablet     amoxicillin (AMOXIL) 500 MG capsule     atenolol (TENORMIN) 25 MG tablet     azelastine (ASTELIN) 0.1 % nasal spray     Calcium Carbonate (CALCIUM 500 PO)     clopidogrel (PLAVIX) 75 MG tablet      diltiazem (CARDIZEM) 60 MG tablet     diltiazem ER (TIAZAC) 180 MG 24 hr ER beaded capsule     fluticasone-vilanterol (BREO ELLIPTA) 200-25 MCG/INH inhaler     furosemide (LASIX) 20 MG tablet     MAGNESIUM PO     metoprolol succinate ER (TOPROL XL) 50 MG 24 hr tablet     mycophenolate (GENERIC EQUIVALENT) 500 MG tablet     ondansetron (ZOFRAN ODT) 4 MG ODT tab     sulfamethoxazole-trimethoprim (BACTRIM) 400-80 MG tablet     No current facility-administered medications for this visit.            Review of Systems:       A complete 10 point review of systems was otherwise negative except as noted in the HPI.        PHYSICAL EXAM:  /72   Pulse 71   SpO2 100%      General: Well developed, well nourished, No apparent distress  Eyes: Anicteric  Nose: Nasal mucosa with no edema or hyperemia.  No polyps  Ears: Hearing grossly normal  Mouth: Oral mucosa is moist, without any lesions. No oropharyngeal exudate.  Respiratory: Good air movement. No crackles. No rhonchi. No wheezes  Cardiac: RRR, normal S1, S2. No murmurs. No JVD  Abdomen: Soft, NT/ND  Musculoskeletal: Extremities normal. No clubbing. No cyanosis. No edema.  Skin: No rash on limited exam  Neuro: Normal mentation. Normal speech.  Psych:Normal affect           Data:   All laboratory and imaging data reviewed.     ECHO:  7/2022   1. Normal LV size, borderline wall thickness, normal global systolic function with an estimated EF of 50 - 55%.    2. Abnormal septal motion consistent with post-operative status and abnormal septal motion consistent with RV pacemaker.    3. Right ventricular cavity size is normal, global systolic RV function is normal with a normal estimate of the RVSP.    4. Severely enlarged left and right atrium.    5. The aortic valve is Cisco 3 Ultra bioprosthesis AVR, mild stenosis and no regurgitation. The mean gradient is 16 mmHg.    6. The mitral valve is sclerotic, mild to moderate mitral regurgitation.    7. Severe MAC present.           PFT:   5/23/22 9/2021    PFT Interpretation:  I personally reviewed and interpreted the PFTs.    Her peftin 2022 has worsened comapred to 2021.   Chest CT: I personally reviewed and interpreted the CT scan.    -Ct chest in 11/2021 has improved when comapred to 8 /2021 8/26    \      No results found for this or any previous visit (from the past 168 hour(s)).             Again, thank you for allowing me to participate in the care of your patient.        Sincerely,        Huang Miguel MD

## 2022-08-23 NOTE — TELEPHONE ENCOUNTER
Spoke with daughter who wanted to ensure ok to start Imuran. Per Dr Miguel, she can start. They also needed nebulizer supplies faxed to pharmacy. RN advised to start medication and we would refax orders. They will reach out if further questions/concerns.  Celestina Armas RN BSN

## 2022-08-23 NOTE — TELEPHONE ENCOUNTER
Prior Authorization Specialty Medication Request    Medication/Dose: arformoterol (BROVANA) 15 MCG/2ML NEBU neb solution  ICD code (if different than what is on RX):    Previously Tried and Failed:      Important Lab Values:   Rationale:     Insurance Name:   Insurance ID:   Insurance Phone Number:     Pharmacy Information (if different than what is on RX)  Name:  [facility]   Phone: [phone]

## 2022-08-23 NOTE — TELEPHONE ENCOUNTER
Central Prior Authorization Team   Phone: [phone]    PA Initiation    Medication: arformoterol (BROVANA) 15 MCG/2ML [facility]  Insurance Company: Chan [employer]osh - Phone [phone] Fax [phone]  Pharmacy Filling the Rx: [facility] [place] [address]  Filling Pharmacy Phone: [phone]  Filling Pharmacy Fax: [phone]  Start Date: 8/23/2022      Initiate PA by phone at [phone].Case # [id number]

## 2022-08-24 NOTE — TELEPHONE ENCOUNTER
PRIOR AUTHORIZATION DENIED    Medication: arformoterol (BROVANA) 15 MCG/2ML NEBU neb solution-PA DENIED     Denial Date: 8/24/2022    Denial Rational: **Be Advised: called pharmacy and pharmacy stated that patient has Medicare Part B; however, Diagnosis of ILD (interstitial lung disease) (H) (J84.9) do not meet Medicare Criteria/Guidelines. Pharmacist at filling pharmacy questioned if patient has a diagnosis of COPD. A new script with new Diagnosis is needed for processing to Medicare Part B. **        Appeal Information:

## 2022-09-02 NOTE — TELEPHONE ENCOUNTER
Central Prior Authorization Team  Phone: [phone]    PA Initiation    Medication: arformoterol (BROVANA) 15 MCG/2ML NEBU [facility] solution  Insurance Company: [employer] Part D - Phone [phone] Fax [phone]  Pharmacy Filling the Rx: [id number] - [place] [address]  Filling Pharmacy Phone: [phone]  Filling Pharmacy Fax:    Start Date: 9/2/2022

## 2022-09-02 NOTE — TELEPHONE ENCOUNTER
Prior Authorization Specialty Medication Request    Medication/Dose: arformoterol (BROVANA) 15 MCG/2ML NEBU neb solution  ICD code (if different than what is on RX):    Previously Tried and Failed:      Important Lab Values:   Rationale:     Insurance Name:   Insurance ID:   Insurance Phone Number:     Pharmacy Information (if different than what is on RX)  Name:  [facility]   Phone: [phone]

## 2022-09-06 NOTE — TELEPHONE ENCOUNTER
Central Prior Authorization Team  Phone: [phone]    Still in process with ins, awaiting outcome

## 2022-09-07 NOTE — TELEPHONE ENCOUNTER
Central Prior Authorization Team  Phone: [phone]    PRIOR AUTHORIZATION DENIED    Medication: arformoterol (BROVANA) 15 MCG/2ML NEBU neb solution    Denial Date: 9/6/2022    Denial Rational: diagnosis does not meet the requirements of a medically accepted indication.     Appeal Information:

## 2022-09-08 NOTE — PROGRESS NOTES
Daughter Valery called asking about lab results and okay to go up on Imuran. Reviewed labs on CareEverywhere, CMP looks stable but the CBC was not done. Patient increased to 75 mg yesterday. Plan to get CBC next week.

## 2022-09-13 NOTE — TELEPHONE ENCOUNTER
Left message informing daughter of Dr Miguel response and change of neb medication. Plan for next lab at follow up in November.

## 2022-09-13 NOTE — TELEPHONE ENCOUNTER
----- Message from Huang Miguel MD sent at 9/13/2022 12:11 PM CDT -----  Regarding: RE: Got labs ??  HI,     I agree.  No need to check for infection.  I ordered peroformist and sent it to her pharmacy instead of brovanna.     Thanks,  Hem  -   ----- Message -----  From: Avani Choi RN  Sent: 9/13/2022  11:20 AM CDT  To: Huang Miguel MD, #  Subject: FW: Got labs ??                                  HI Hem,    I reviewed her lab, WBC count is up again, to 16. Spoke to daughter, patient is not having any signs of infection, cough is the same, just a little in the morning, no colored production, no fevers or not feeling well. Not sure if we are concerned or when we want to recheck, if we do?    Also her Brovana was denied, due to diagnosis. We tried ILD and Reactive Airway disease. Do you have another dx code I can try or should we try switching nebs?    Celestina Choi RN  ILD Care Coordinator  [phone]     ----- Message -----  From: Almas Bryant  Sent: 9/13/2022   9:56 AM CDT  To: Interstitial Lung Disease Clinic Nurses-  Subject: Got labs ??                                      She had labs done yesterday @ [facility] - review in care everywhere when you have a chance ..Agueda !!!

## 2022-09-15 NOTE — TELEPHONE ENCOUNTER
[facility] Call Center    Phone Message    May a detailed message be left on voicemail: yes     Reason for Call: Medication Refill Request    Has the patient contacted the pharmacy for the refill? Yes   Name of medication being requested: predniSONE (DELTASONE) 20 MG tablet  Provider who prescribed the medication: Dr Miguel  Pharmacy: [id number] [place] [address]  Date medication is needed: ASAP         Action Taken: Message routed to:  [facility] Pulm    Travel Screening: Not Applicable

## 2022-09-16 NOTE — TELEPHONE ENCOUNTER
Documentation from 5/26/22 Telephone encounter validated Rx for 20 mg prednisone daily.     Refill request sent to pharmacy.     Latonya Ocampo, RN, BSN  ILD Nurse Care Coordinator  (P) [phone]

## 2022-09-19 NOTE — TELEPHONE ENCOUNTER
Prior Authorization Not Needed per Insurance    Medication: Formoterol fumarate 20mcg/2ml nebulizer solution-PA NOT NEEDED   Insurance Company: Chan Rojas - Phone [phone] Fax [phone]  Expected CoPay:      Pharmacy Filling the Rx: [facility] [place] [address]  Pharmacy Notified: Yes  Patient Notified: No    Called pharmacy and pharmacy stated that PA is Not Needed and medication is being billed to Patients Medicare Part B. Spoke to Misael (pharmacitst) and he stated that medication is covered under patients Medicare Part B; however, pharmacy is getting a lost in cost for medication and had requested a new script for arformoterol (BROVANA) 15 MCG/2ML NEBU neb solution to be sent in which they have received a script on 8/18/2022 and 9/14/2022.   Misael stated that last filled for arformoterol (BROVANA) 15 MCG/2ML NEBU neb solution was on 9/1/2022 quantity 120 ml for 30 day supply and patient has picked up medication. Next available fill date is 9/29/2022.

## 2022-09-19 NOTE — TELEPHONE ENCOUNTER
Prior Authorization Specialty Medication Request    Medication/Dose: Formoterol fumarate 20mcg/2ml nebulizer solution  ICD code (if different than what is on RX):    Previously Tried and Failed:      Important Lab Values:   Rationale:     Insurance Name:   Insurance ID:   Insurance Phone Number:     Pharmacy Information (if different than what is on RX)  Name:  [facility]  Phone:  [phone]

## 2022-09-20 NOTE — TELEPHONE ENCOUNTER
Pt's daughter called in today stating pt did not tolerate the Imuran increase to 75. Pt had no appetite at all. Pt and daughter are very concerned about her weight as her PCP said today she was very malnourished. Wondering if she should go down to 50 or if there needs to be other options explored. PCP did have labs drawn today (metabolic and CBC) per daughter. Pt has not taken any Imuran since Saturday.   Celestina Armas RN BSN

## 2022-09-22 NOTE — TELEPHONE ENCOUNTER
Updated patient's daughter with message from Dr. Miguel:   Can you please add her to the ILD list and see what our group has options wise.  We have tried cellcept, my fortis and now imuran.     Can you tell daughter that we will talk with the group and get back to her. In meantime she goes backdown to 50 if that was tolerable     Thanks,   Hem     Will place pt on ILD Conference list for Monday 9/26 conference and update pt's daughter after with recommendation.     Latonya Ocampo, RN, BSN  ILD Nurse Care Coordinator  (P) [phone]

## 2022-09-22 NOTE — TELEPHONE ENCOUNTER
Daughter called clinic back; patient has been holding imuran and patient prefers to continue holding imuran until conference discussion on Monday. Patient and daughter will await further instruction after Monday conference.     Latonya Ocampo RN, BSN  ILD Nurse Care Coordinator  (P) [phone]

## 2022-09-26 NOTE — PROGRESS NOTES
Message  Received: Today  Huang Miguel MD Kershner, Sophia, RN; P [facility] I talked with Dr. Ferro.     I also called and talked with the daughter.     I think her recent episode of anorexia was mainkly due to our pneumonia than Imuran.  But to be on safe side and also consulting with daughter we have decided to stay off of Imuran as well.     -Dr. Ferro has suggested M-W-F of Azithromycin. 500 mg.  We keep prednisone at 20 mg for now.     As of now patient is in hospital and doctors are treating pneumonia as well as giving her high dose prednisone.         -Daughter will call us once patient is out of hospital.  Hospitalist is planning to discharge her on 40 mg of prednisone I think.  We will taper it down to 20 over 2 weeks with 10 mg per week taper.     Once she is back to 20 and azithromycin has started we will slowly taper it doen by 5 mg every months and leave her on 5 mg of prednisone and azithromycin 500 M-w-F.     Hoping everything goes as per plan.     Sorry for delayed responses. This wasn't a simple case.     Huang Orona             Previous Messages       ----- Message -----   From: Latonya Ocampo, RN   Sent: 9/26/2022   8:37 AM CDT   To: Huang Miguel MD, *     CHRISTOPHER Encinas Porsha is off Imuran and was going to wait until today's conference. Not sure if another week would be okay for her to remain off or if we should speak with her about being on a reduced dose as a result of conference being cancelled?     Thank you for your reply all,     Latonya

## (undated) RX ORDER — LEVALBUTEROL INHALATION SOLUTION 1.25 MG/3ML
SOLUTION RESPIRATORY (INHALATION)
Status: DISPENSED
Start: 2022-01-01